# Patient Record
Sex: MALE | Race: WHITE
[De-identification: names, ages, dates, MRNs, and addresses within clinical notes are randomized per-mention and may not be internally consistent; named-entity substitution may affect disease eponyms.]

---

## 2021-06-20 ENCOUNTER — HOSPITAL ENCOUNTER (EMERGENCY)
Dept: HOSPITAL 41 - JD.ED | Age: 31
Discharge: HOME | End: 2021-06-20
Payer: COMMERCIAL

## 2021-06-20 DIAGNOSIS — K13.79: ICD-10-CM

## 2021-06-20 DIAGNOSIS — M02.372: Primary | ICD-10-CM

## 2021-06-20 DIAGNOSIS — R60.0: ICD-10-CM

## 2021-06-20 PROCEDURE — 86430 RHEUMATOID FACTOR TEST QUAL: CPT

## 2021-06-20 PROCEDURE — 85025 COMPLETE CBC W/AUTO DIFF WBC: CPT

## 2021-06-20 PROCEDURE — 84550 ASSAY OF BLOOD/URIC ACID: CPT

## 2021-06-20 PROCEDURE — 86140 C-REACTIVE PROTEIN: CPT

## 2021-06-20 PROCEDURE — 86038 ANTINUCLEAR ANTIBODIES: CPT

## 2021-06-20 PROCEDURE — 36415 COLL VENOUS BLD VENIPUNCTURE: CPT

## 2021-06-20 PROCEDURE — 83735 ASSAY OF MAGNESIUM: CPT

## 2021-06-20 PROCEDURE — 73630 X-RAY EXAM OF FOOT: CPT

## 2021-06-20 PROCEDURE — 85652 RBC SED RATE AUTOMATED: CPT

## 2021-06-20 PROCEDURE — 80053 COMPREHEN METABOLIC PANEL: CPT

## 2021-06-20 PROCEDURE — 87801 DETECT AGNT MULT DNA AMPLI: CPT

## 2021-06-20 PROCEDURE — 99283 EMERGENCY DEPT VISIT LOW MDM: CPT

## 2021-06-20 NOTE — CR
Left foot: 4 views of the left foot were obtained.

 

Comparison: No prior study.

 

Joint spaces are preserved.  Mild soft tissue swelling is noted.  No 

acute fracture, dislocation or other bony abnormality is appreciated.

 

Impression:

1.  Mild soft tissue swelling.

2.  Left foot study is otherwise unremarkable.

 

Diagnostic code #2

## 2021-06-20 NOTE — EDM.PDOC
ED HPI GENERAL MEDICAL PROBLEM





- General


Chief Complaint: Lower Extremity Injury/Pain


Stated Complaint: FEET SWOLLEN AND PAINFUL  HX OF GOUT


Time Seen by Provider: 06/20/21 08:10


Source of Information: Reports: Patient, Family


History Limitations: Reports: No Limitations





- History of Present Illness


INITIAL COMMENTS - FREE TEXT/NARRATIVE: 





The patient presents with bilateral foot pain.  This has been an ongoing 

problems since last April.  He said he developed gout in his right foot.  He was

given indomethacin and it helped but was never 100%.  He saw Dr Nayak and he 

agreed with the gout diagnosis.  He takes allupurinol but not all the time.  He 

has had 2 more flair ups of gout since then.  He had some dental work done on 

6/10/21.  A few days after that he developed some lesions on his tongue.  He saw

his dentist again and he was treated with some dental paste.  He still has the 

sores.  A couple days ago he developed pain in both feet.  He did not injure his

feet.  He has some swelling to the left lateral foot.  He has pain to the 

achiles area in both and to the front and lateral left foot.  He did have an x-

ray of his right foot in the past.  He had a fever for a few days when the sores

showed up.  The fever is gone now.  He has no fever now.  He has no chills, 

cough, chest pain, shortness of breath, abdominal pain, nausea, vomiting, 

diarrhea or dysuria.


Onset: Gradual


Duration: Day(s):


Location: Reports: Lower Extremity, Left (foot), Lower Extremity, Right (foot)


Quality: Reports: Sharp


Severity: Moderate


Improves with: Reports: Immobilization


Worsens with: Reports: Movement


Context: Denies: Trauma


Associated Symptoms: Reports: Fever/Chills (a few days ago).  Denies: Chest 

Pain, Cough, Headaches, Nausea/Vomiting, Shortness of Breath


  ** Bilateral Feet


Pain Score (Numeric/FACES): 4





- Related Data


                                    Allergies











Allergy/AdvReac Type Severity Reaction Status Date / Time


 


Penicillins Allergy  Rash Verified 06/20/21 08:12











Home Meds: 


                                    Home Meds





Acyclovir 400 mg PO TID #21 tablet 06/20/21 [Rx]


Allopurinol [Zyloprim] 300 mg PO DAILY 06/20/21 [History]


predniSONE [Prednisone] 40 mg PO DAILY #14 tablet 06/20/21 [Rx]











Past Medical History





- Past Surgical History


HEENT Surgical History: Reports: Tonsillectomy





Social & Family History





- Tobacco Use


Tobacco Use Status *Q: Never Tobacco User





- Caffeine Use


Caffeine Use: Reports: None





- Recreational Drug Use


Recreational Drug Use: No





Review of Systems





- Review of Systems


Review Of Systems: See Below


Constitutional: Reports: Fever.  Denies: Chills


Eyes: Reports: No Symptoms


Ears: Reports: No Symptoms


Nose: Reports: No Symptoms


Mouth/Throat: Reports: Other (sores on his tongue and mouth)


Respiratory: Reports: No Symptoms


Cardiovascular: Reports: No Symptoms


GI/Abdominal: Reports: No Symptoms


Genitourinary: Reports: No Symptoms


Musculoskeletal: Reports: Other (right and left foot pain and swelling)





ED EXAM, GENERAL





- Physical Exam


Exam: See Below


Exam Limited By: No Limitations


General Appearance: Alert, No Apparent Distress


Ears: Normal External Exam


Nose: Normal Inspection


Throat/Mouth: Other (ulcers to his right tongue and upper inner cheek)


Head: Atraumatic, Normocephalic


Neck: Normal Inspection


Respiratory/Chest: No Respiratory Distress, Lungs Clear, Normal Breath Sounds


Cardiovascular: Regular Rate, Rhythm, No Edema, No Murmur


GI/Abdominal: Soft, Non-Tender, No Organomegaly, No Mass


Back Exam: Normal Inspection


Extremities: Other (Mild edema and pain upon palpation to the anterior lateral 

left food.  Good sensation and pulses distaly.  No pain upon palpation to the 

right foot.  Mild edema to the medial foot.  Good sensation and pulses 

distally.)





Course





- Vital Signs


Last Recorded V/S: 


                                Last Vital Signs











Temp  97.4 F   06/20/21 08:14


 


Pulse  72   06/20/21 08:14


 


Resp  13   06/20/21 08:14


 


BP  147/96 H  06/20/21 08:14


 


Pulse Ox  98   06/20/21 08:14














- Orders/Labs/Meds


Orders: 


                               Active Orders 24 hr











 Category Date Time Status


 


 Cardiac Monitoring [RC] .AS DIRECTED Care  06/20/21 08:35 Active


 


 Foot Comp Min 3V Lt [CR] Stat Exams  06/20/21 10:19 Ordered


 


 HERPES/VARICELLA [MREF] Stat Lab  06/20/21 09:20 Received











Labs: 


                                Laboratory Tests











  06/20/21 06/20/21 06/20/21 Range/Units





  09:00 09:00 09:00 


 


WBC  11.02 H    (4.23-9.07)  K/mm3


 


RBC  4.91    (4.63-6.08)  M/mm3


 


Hgb  15.0    (13.7-17.5)  gm/dl


 


Hct  43.4    (40.1-51.0)  %


 


MCV  88.4    (79.0-92.2)  fl


 


MCH  30.5    (25.7-32.2)  pg


 


MCHC  34.6    (32.2-35.5)  g/dl


 


RDW Std Deviation  39.9    (35.1-43.9)  fL


 


Plt Count  256    (163-337)  K/mm3


 


MPV  8.4 L    (9.4-12.3)  fl


 


Neut % (Auto)  64.3    (34.0-67.9)  %


 


Lymph % (Auto)  24.1    (21.8-53.1)  %


 


Mono % (Auto)  9.4    (5.3-12.2)  %


 


Eos % (Auto)  1.3    (0.8-7.0)  


 


Baso % (Auto)  0.5    (0.1-1.2)  %


 


Neut # (Auto)  7.09 H    (1.78-5.38)  K/mm3


 


Lymph # (Auto)  2.66    (1.32-3.57)  K/mm3


 


Mono # (Auto)  1.04 H    (0.30-0.82)  K/mm3


 


Eos # (Auto)  0.14    (0.04-0.54)  K/mm3


 


Baso # (Auto)  0.05    (0.01-0.08)  K/mm3


 


Manual Slide Review  Abnormal smear    


 


ESR   17 H   (0-15)  mm/hr


 


Sodium    141  (136-145)  mEq/L


 


Potassium    4.3  (3.5-5.1)  mEq/L


 


Chloride    103  ()  mEq/L


 


Carbon Dioxide    28  (21-32)  mEq/L


 


Anion Gap    14.3  (5-15)  


 


BUN    15  (7-18)  mg/dL


 


Creatinine    0.9  (0.7-1.3)  mg/dL


 


Est Cr Clr Drug Dosing    109.15  mL/min


 


Estimated GFR (MDRD)    > 60  (>60)  mL/min


 


BUN/Creatinine Ratio    16.7  (14-18)  


 


Glucose    90  (70-99)  mg/dL


 


Uric Acid    4.0  (3.5-7.2)  mg/dL


 


Calcium    9.3  (8.5-10.1)  mg/dL


 


Magnesium    2.1  (1.8-2.4)  mg/dL


 


Total Bilirubin    0.4  (0.2-1.0)  mg/dL


 


AST    13 L  (15-37)  U/L


 


ALT    26  (16-63)  U/L


 


Alkaline Phosphatase    98  ()  U/L


 


C-Reactive Protein    2.4 H*  (<1.0)  mg/dL


 


Total Protein    8.0  (6.4-8.2)  g/dl


 


Albumin    4.1  (3.4-5.0)  g/dl


 


Globulin    3.9  gm/dL


 


Albumin/Globulin Ratio    1.1  (1-2)  


 


Rheumatoid Factor Scrn     (NEGATIVE)  














  06/20/21 Range/Units





  09:00 


 


WBC   (4.23-9.07)  K/mm3


 


RBC   (4.63-6.08)  M/mm3


 


Hgb   (13.7-17.5)  gm/dl


 


Hct   (40.1-51.0)  %


 


MCV   (79.0-92.2)  fl


 


MCH   (25.7-32.2)  pg


 


MCHC   (32.2-35.5)  g/dl


 


RDW Std Deviation   (35.1-43.9)  fL


 


Plt Count   (163-337)  K/mm3


 


MPV   (9.4-12.3)  fl


 


Neut % (Auto)   (34.0-67.9)  %


 


Lymph % (Auto)   (21.8-53.1)  %


 


Mono % (Auto)   (5.3-12.2)  %


 


Eos % (Auto)   (0.8-7.0)  


 


Baso % (Auto)   (0.1-1.2)  %


 


Neut # (Auto)   (1.78-5.38)  K/mm3


 


Lymph # (Auto)   (1.32-3.57)  K/mm3


 


Mono # (Auto)   (0.30-0.82)  K/mm3


 


Eos # (Auto)   (0.04-0.54)  K/mm3


 


Baso # (Auto)   (0.01-0.08)  K/mm3


 


Manual Slide Review   


 


ESR   (0-15)  mm/hr


 


Sodium   (136-145)  mEq/L


 


Potassium   (3.5-5.1)  mEq/L


 


Chloride   ()  mEq/L


 


Carbon Dioxide   (21-32)  mEq/L


 


Anion Gap   (5-15)  


 


BUN   (7-18)  mg/dL


 


Creatinine   (0.7-1.3)  mg/dL


 


Est Cr Clr Drug Dosing   mL/min


 


Estimated GFR (MDRD)   (>60)  mL/min


 


BUN/Creatinine Ratio   (14-18)  


 


Glucose   (70-99)  mg/dL


 


Uric Acid   (3.5-7.2)  mg/dL


 


Calcium   (8.5-10.1)  mg/dL


 


Magnesium   (1.8-2.4)  mg/dL


 


Total Bilirubin   (0.2-1.0)  mg/dL


 


AST   (15-37)  U/L


 


ALT   (16-63)  U/L


 


Alkaline Phosphatase   ()  U/L


 


C-Reactive Protein   (<1.0)  mg/dL


 


Total Protein   (6.4-8.2)  g/dl


 


Albumin   (3.4-5.0)  g/dl


 


Globulin   gm/dL


 


Albumin/Globulin Ratio   (1-2)  


 


Rheumatoid Factor Scrn  Negative  (NEGATIVE)  














- Re-Assessments/Exams


Free Text/Narrative Re-Assessment/Exam: 





06/20/21 09:42


I ordered labs and an HSV culture of his tongue and mouth.


06/20/21 10:28


His WBC was elevated at 11.02.  His CRP is elevated at 2.4.  His uric acid is 

normal at 4.  His ESR is elevated at 17.  I feel he has a reactive arthritis.  I

 will get him on some acyclivir for the oral lesions and prednisone daily.


06/20/21 10:30


I did an x-ray of his foot and it looks good.


06/20/21 10:35








Departure





- Departure


Time of Disposition: 10:35


Disposition: Home, Self-Care 01


Condition: Good


Clinical Impression: 


 Reactive arthritis, Oral mucosal lesion








- Discharge Information


*PRESCRIPTION DRUG MONITORING PROGRAM REVIEWED*: Not Applicable


*COPY OF PRESCRIPTION DRUG MONITORING REPORT IN PATIENT ANNA MARIE: Not Applicable


Prescriptions: 


Acyclovir 400 mg PO TID #21 tablet


predniSONE [Prednisone] 40 mg PO DAILY #14 tablet


Referrals: 


Dante Nava MD [Primary Care Provider] - 1 Week


Forms:  ED Department Discharge


Additional Instructions: 


Take the prednisone 40mg daily for 7 days.  Take tylenol as needed for pain.  

Take the acyclovir 3 times per day for 7 days.  Ice your ankle or use heat which

every one feels better.  Follow up with Dr Nava within a week.  Please 

return if you are worse.





Sepsis Event Note (ED)





- Evaluation


Sepsis Screening Result: No Definite Risk





- Focused Exam


Vital Signs: 


                                   Vital Signs











  Temp Pulse Resp BP Pulse Ox


 


 06/20/21 08:14  97.4 F  72  13  147/96 H  98














- My Orders


Last 24 Hours: 


My Active Orders





06/20/21 08:35


Cardiac Monitoring [RC] .AS DIRECTED 





06/20/21 09:20


HERPES/VARICELLA [MREF] Stat 





06/20/21 10:19


Foot Comp Min 3V Lt [CR] Stat 














- Assessment/Plan


Last 24 Hours: 


My Active Orders





06/20/21 08:35


Cardiac Monitoring [RC] .AS DIRECTED 





06/20/21 09:20


HERPES/VARICELLA [MREF] Stat 





06/20/21 10:19


Foot Comp Min 3V Lt [CR] Stat